# Patient Record
Sex: FEMALE | Race: WHITE | NOT HISPANIC OR LATINO | Employment: UNEMPLOYED | URBAN - METROPOLITAN AREA
[De-identification: names, ages, dates, MRNs, and addresses within clinical notes are randomized per-mention and may not be internally consistent; named-entity substitution may affect disease eponyms.]

---

## 2018-01-24 ENCOUNTER — TRANSCRIBE ORDERS (OUTPATIENT)
Dept: ADMINISTRATIVE | Facility: HOSPITAL | Age: 23
End: 2018-01-24

## 2018-01-24 DIAGNOSIS — N63.0 LUMP OR MASS IN BREAST: Primary | ICD-10-CM

## 2018-01-29 ENCOUNTER — HOSPITAL ENCOUNTER (OUTPATIENT)
Dept: ULTRASOUND IMAGING | Facility: CLINIC | Age: 23
Discharge: HOME | End: 2018-01-29
Payer: COMMERCIAL

## 2018-01-29 DIAGNOSIS — N63.0 LUMP OR MASS IN BREAST: ICD-10-CM

## 2018-01-29 PROCEDURE — 76642 ULTRASOUND BREAST LIMITED: CPT

## 2018-03-09 ENCOUNTER — HOSPITAL ENCOUNTER (OUTPATIENT)
Dept: ULTRASOUND IMAGING | Facility: CLINIC | Age: 23
Discharge: HOME | End: 2018-03-09
Payer: COMMERCIAL

## 2018-03-09 DIAGNOSIS — N63.0 LUMP OR MASS IN BREAST: ICD-10-CM

## 2018-03-09 PROCEDURE — 76642 ULTRASOUND BREAST LIMITED: CPT

## 2018-03-27 ENCOUNTER — TRANSCRIBE ORDERS (OUTPATIENT)
Dept: ADMINISTRATIVE | Facility: HOSPITAL | Age: 23
End: 2018-03-27

## 2018-03-27 DIAGNOSIS — N63.0 BREAST MASS: Primary | ICD-10-CM

## 2018-09-04 ENCOUNTER — HOSPITAL ENCOUNTER (OUTPATIENT)
Dept: ULTRASOUND IMAGING | Facility: CLINIC | Age: 23
Discharge: HOME | End: 2018-09-04
Payer: COMMERCIAL

## 2018-09-04 DIAGNOSIS — N63.0 BREAST MASS: ICD-10-CM

## 2018-09-04 PROCEDURE — 76642 ULTRASOUND BREAST LIMITED: CPT

## 2018-10-17 ENCOUNTER — HOSPITAL ENCOUNTER (EMERGENCY)
Facility: HOSPITAL | Age: 23
Discharge: HOME | End: 2018-10-17
Attending: EMERGENCY MEDICINE
Payer: COMMERCIAL

## 2018-10-17 VITALS
HEART RATE: 94 BPM | OXYGEN SATURATION: 99 % | RESPIRATION RATE: 18 BRPM | DIASTOLIC BLOOD PRESSURE: 74 MMHG | TEMPERATURE: 97.8 F | WEIGHT: 187.6 LBS | SYSTOLIC BLOOD PRESSURE: 124 MMHG

## 2018-10-17 DIAGNOSIS — Z34.90 PREGNANCY AT EARLY STAGE: ICD-10-CM

## 2018-10-17 DIAGNOSIS — R51.9 HEADACHE: ICD-10-CM

## 2018-10-17 DIAGNOSIS — E86.0 DEHYDRATION: ICD-10-CM

## 2018-10-17 DIAGNOSIS — O21.9 NAUSEA/VOMITING IN PREGNANCY: Primary | ICD-10-CM

## 2018-10-17 LAB
ANION GAP SERPL CALCULATED.3IONS-SCNC: 9 MMOL/L (ref 4–13)
BASOPHILS # BLD AUTO: 0.03 THOUSANDS/ΜL (ref 0–0.1)
BASOPHILS NFR BLD AUTO: 0 % (ref 0–1)
BILIRUB UR QL STRIP: ABNORMAL
BUN SERPL-MCNC: 10 MG/DL (ref 5–25)
CALCIUM SERPL-MCNC: 9.8 MG/DL (ref 8.3–10.1)
CHLORIDE SERPL-SCNC: 99 MMOL/L (ref 100–108)
CLARITY UR: CLEAR
CLARITY, POC: CLEAR
CO2 SERPL-SCNC: 25 MMOL/L (ref 21–32)
COLOR UR: YELLOW
COLOR, POC: YELLOW
CREAT SERPL-MCNC: 0.62 MG/DL (ref 0.6–1.3)
EOSINOPHIL # BLD AUTO: 0.04 THOUSAND/ΜL (ref 0–0.61)
EOSINOPHIL NFR BLD AUTO: 1 % (ref 0–6)
ERYTHROCYTE [DISTWIDTH] IN BLOOD BY AUTOMATED COUNT: 12.3 % (ref 11.6–15.1)
EXT PREG TEST URINE: POSITIVE
GFR SERPL CREATININE-BSD FRML MDRD: 130 ML/MIN/1.73SQ M
GLUCOSE SERPL-MCNC: 101 MG/DL (ref 65–140)
GLUCOSE UR STRIP-MCNC: NEGATIVE MG/DL
HCT VFR BLD AUTO: 39.4 % (ref 34.8–46.1)
HGB BLD-MCNC: 13.5 G/DL (ref 11.5–15.4)
HGB UR QL STRIP.AUTO: NEGATIVE
IMM GRANULOCYTES # BLD AUTO: 0.01 THOUSAND/UL (ref 0–0.2)
IMM GRANULOCYTES NFR BLD AUTO: 0 % (ref 0–2)
KETONES UR STRIP-MCNC: ABNORMAL MG/DL
LEUKOCYTE ESTERASE UR QL STRIP: NEGATIVE
LYMPHOCYTES # BLD AUTO: 1.05 THOUSANDS/ΜL (ref 0.6–4.47)
LYMPHOCYTES NFR BLD AUTO: 15 % (ref 14–44)
MCH RBC QN AUTO: 28.8 PG (ref 26.8–34.3)
MCHC RBC AUTO-ENTMCNC: 34.3 G/DL (ref 31.4–37.4)
MCV RBC AUTO: 84 FL (ref 82–98)
MONOCYTES # BLD AUTO: 0.43 THOUSAND/ΜL (ref 0.17–1.22)
MONOCYTES NFR BLD AUTO: 6 % (ref 4–12)
NEUTROPHILS # BLD AUTO: 5.34 THOUSANDS/ΜL (ref 1.85–7.62)
NEUTS SEG NFR BLD AUTO: 78 % (ref 43–75)
NITRITE UR QL STRIP: NEGATIVE
NRBC BLD AUTO-RTO: 0 /100 WBCS
PH UR STRIP.AUTO: 6 [PH] (ref 4.5–8)
PLATELET # BLD AUTO: 296 THOUSANDS/UL (ref 149–390)
PMV BLD AUTO: 10 FL (ref 8.9–12.7)
POTASSIUM SERPL-SCNC: 3.8 MMOL/L (ref 3.5–5.3)
PROT UR STRIP-MCNC: ABNORMAL MG/DL
RBC # BLD AUTO: 4.69 MILLION/UL (ref 3.81–5.12)
SODIUM SERPL-SCNC: 133 MMOL/L (ref 136–145)
SP GR UR STRIP.AUTO: >=1.03 (ref 1–1.03)
UROBILINOGEN UR QL STRIP.AUTO: 1 E.U./DL
WBC # BLD AUTO: 6.9 THOUSAND/UL (ref 4.31–10.16)

## 2018-10-17 PROCEDURE — 96374 THER/PROPH/DIAG INJ IV PUSH: CPT

## 2018-10-17 PROCEDURE — 80048 BASIC METABOLIC PNL TOTAL CA: CPT | Performed by: EMERGENCY MEDICINE

## 2018-10-17 PROCEDURE — 81025 URINE PREGNANCY TEST: CPT | Performed by: EMERGENCY MEDICINE

## 2018-10-17 PROCEDURE — 96361 HYDRATE IV INFUSION ADD-ON: CPT

## 2018-10-17 PROCEDURE — 85025 COMPLETE CBC W/AUTO DIFF WBC: CPT | Performed by: EMERGENCY MEDICINE

## 2018-10-17 PROCEDURE — 87086 URINE CULTURE/COLONY COUNT: CPT

## 2018-10-17 PROCEDURE — 99283 EMERGENCY DEPT VISIT LOW MDM: CPT

## 2018-10-17 PROCEDURE — 36415 COLL VENOUS BLD VENIPUNCTURE: CPT | Performed by: EMERGENCY MEDICINE

## 2018-10-17 PROCEDURE — 81001 URINALYSIS AUTO W/SCOPE: CPT

## 2018-10-17 RX ORDER — ONDANSETRON 2 MG/ML
4 INJECTION INTRAMUSCULAR; INTRAVENOUS ONCE
Status: COMPLETED | OUTPATIENT
Start: 2018-10-17 | End: 2018-10-17

## 2018-10-17 RX ORDER — ACETAMINOPHEN 325 MG/1
650 TABLET ORAL ONCE
Status: COMPLETED | OUTPATIENT
Start: 2018-10-17 | End: 2018-10-17

## 2018-10-17 RX ORDER — ONDANSETRON 4 MG/1
4 TABLET, ORALLY DISINTEGRATING ORAL EVERY 8 HOURS PRN
Qty: 20 TABLET | Refills: 0 | Status: SHIPPED | OUTPATIENT
Start: 2018-10-17 | End: 2018-10-31

## 2018-10-17 RX ADMIN — ACETAMINOPHEN 650 MG: 325 TABLET, FILM COATED ORAL at 22:41

## 2018-10-17 RX ADMIN — ONDANSETRON 4 MG: 2 INJECTION INTRAMUSCULAR; INTRAVENOUS at 22:34

## 2018-10-17 RX ADMIN — SODIUM CHLORIDE 1000 ML: 0.9 INJECTION, SOLUTION INTRAVENOUS at 22:34

## 2018-10-18 LAB
BACTERIA UR QL AUTO: ABNORMAL /HPF
MUCOUS THREADS UR QL AUTO: ABNORMAL
NON-SQ EPI CELLS URNS QL MICRO: ABNORMAL /HPF
RBC #/AREA URNS AUTO: ABNORMAL /HPF
WBC #/AREA URNS AUTO: ABNORMAL /HPF

## 2018-10-18 NOTE — DISCHARGE INSTRUCTIONS
Nausea and Vomiting in Pregnancy   WHAT YOU NEED TO KNOW:   Nausea and vomiting can happen any time of day  These symptoms usually start before the 9th week of pregnancy, and end by the 14th week (second trimester)  Some women can have nausea and vomiting for a longer time  These symptoms can affect some women throughout the entire pregnancy  Nausea and vomiting do not harm your baby  These symptoms can make it hard for you to do your daily activities  DISCHARGE INSTRUCTIONS:   Return to the emergency department if:   · You have signs of dehydration  Examples are dark yellow urine, dry mouth and lips, dry skin, fast heartbeat, and urinating less than usual     · You have severe abdominal pain  · You feel too weak or dizzy to stand up  · You see blood in your vomit or bowel movements  Contact your healthcare provider if:   · You vomit more than 4 times in 1 day  · You have not been able to keep liquids down for more than 1 day  · You lose more than 2 pounds  · You have a fever  · Your nausea and vomiting continue longer than 14 weeks  · You have questions or concerns about your condition or care  Nutrition changes you can make to manage nausea and vomiting:   · Eat small meals throughout the day instead of 3 large meals  You may be more likely to have nausea and vomiting when your stomach is empty  Eat foods that are low in fat and high in protein  Examples are lean meat, beans, turkey, and chicken without the skin  Eat a small snack, such as crackers, dry cereal, or a small sandwich before you go to bed  · Eat some crackers or dry toast before you get out of bed in the morning  Get out of bed slowly  Sudden movements could cause you to get dizzy and nauseated  · Eat bland foods when you feel nauseated  Examples of bland foods include dry toast, dry cereal, plain pasta, white rice, and bread  Other bland foods include saltine crackers, bananas, gelatin, and pretzels   Avoid spicy, greasy, and fried foods  Avoid any other foods that make you feel nauseated  · Drink liquids that contain ginger  Drink ginger ale made with real ginger or ginger tea made with fresh grated ginger  Ginger capsules or ginger candies may also help to decrease nausea and vomiting  · Drink liquids between meals instead of with meals  Wait at least 30 minutes after you eat to drink liquids  Drink small amounts of liquids often throughout the day to prevent dehydration  Ask how much liquid you should drink each day  Other changes you can make to manage nausea and vomiting:   · Avoid smells that bother you  Strong odors may cause nausea and vomiting to start, or make it worse  Take a short walk, turn on a fan, or try to sleep with the window open to get fresh air  When you are cooking, open windows to get rid of smells that may cause nausea  · Do not brush your teeth right after you eat  if it makes you nauseated  · Rest when you need to  Start activity slowly and work up to your usual routine as you start to feel better  · Talk to your healthcare provider about your prenatal vitamins  Prenatal vitamins can cause nausea for some women  Try taking your prenatal vitamin at night or with a snack  If this change does not help, your healthcare provider may recommend a different type of vitamin  · Do not use any medicines, vitamins, or supplements to manage your symptoms without asking your healthcare provider  Many medicines can harm an unborn baby  · Light to moderate exercise  may help to decrease your symptoms  It may also help you to sleep better at night  Ask your healthcare provider about the best exercise plan for you  Follow up with your healthcare provider as directed:  Write down your questions so you remember to ask them during your visits     © 2017 [employer] Information is for End User's use only and may not be sold, redistributed or otherwise used for commercial purposes  All illustrations and images included in CareNotes® are the copyrighted property of [employer] M [employer]  or [employer]  The above information is an  only  It is not intended as medical advice for individual conditions or treatments  Talk to your doctor, nurse or pharmacist before following any medical regimen to see if it is safe and effective for you  Acute Headache   WHAT YOU NEED TO KNOW:   What is an acute headache? An acute headache is pain or discomfort that starts suddenly and gets worse quickly  You may have an acute headache only when you feel stress or eat certain foods  Other acute headache pain can happen every day, and sometimes several times a day  What are the most common types of acute headache? · Tension headache  is the most common type of headache  These headaches typically occur in the late afternoon and go away by evening  The pain is usually mild or moderate  You may have problems tolerating bright light or loud noise  The pain is usually across the forehead or in the back of the head, often only on one side  These headaches may occur every day  · Migraine headaches  cause moderate or severe pain  The headache generally lasts from 1 to 3 days and tends to come back  Pain is usually on only one side, but it may change sides  Migraines often occur in the temple, the back of the head, or behind the eye  The pain may throb or be sharp and steady  · A migraine with aura  means you see or feel something before a migraine  You may see a small spot surrounded by bright zigzag lines  Other signs or symptoms may follow the aura  · Cluster headache  pain is usually only on one side  It often causes severe pain, and can last for 30 minutes to 2 hours  These headaches may occur 1 or 2 times each day, more often at night  The pain may wake you  What causes acute headaches? The cause of your headache may not be known   The following conditions can trigger a headache:  · Stress or tension, hours or even days after stressful events    · Fatigue, a lack of sleep or changes in your usual sleep pattern, or a nap during the day    · Menstruation, especially after pregnancy, or use of birth control pills or hormone replacement therapy    · Food such as cured meats, artificial sweeteners, alcohol, dark chocolate, and MSG     · Suddenly not having caffeine if you usually have larger amounts    · A medical problem, such as an infection, tooth pain, neck or sinus pain, thyroid problems, or a tumor    · A head injury  How is the type of acute headache diagnosed and treated? Your healthcare provider will ask you to describe your pain and rate it on a scale from 1 to 10  Tell the provider how often you have headaches and how long they last  Also describe any other symptoms you have along with headaches, such as dizziness or blurred vision  · Medicines  may be given to manage or prevent headaches  The medicine will depend on the type of acute headache you have  Do not wait until the pain is severe before you take your medicine  You may be able to take over-the-counter pain medicines as needed  Examples include NSAIDs and acetaminophen  Ask your healthcare provider which medicine is right for you  Ask how much to take and when to take it  Follow directions  These medicines can cause stomach bleeding or kidney or liver damage if not taken correctly  · Biofeedback  may be used to help you manage stress  Electrodes (wires) are placed on your body and attached to a monitor  You will learn how to change stress reactions  For example, you learn to slow your heart rate when you become upset  · Cognitive behavior therapy,  or stress management, may be used with other therapies to prevent headaches  What can I do to manage my symptoms? · Apply heat or ice  on the headache area  Use a heat or ice pack  For an ice pack, you can also put crushed ice in a plastic bag   Cover the pack or bag with a towel before you apply it to your skin  Ice and heat both help decrease pain, and heat also helps decrease muscle spasms  Apply heat for 20 to 30 minutes every 2 hours  Apply ice for 15 to 20 minutes every hour  Apply heat or ice for as long and for as many days as directed  You may alternate heat and ice  · Relax your muscles  Lie down in a comfortable position and close your eyes  Relax your muscles slowly  Start at your toes and work your way up your body  · Keep a record of your headaches  Write down when your headaches start and stop  Include your symptoms and what you were doing when the headache began  Record what you ate or drank for 24 hours before the headache started  Describe the pain and where it hurts  Keep track of what you did to treat your headache and if it worked  What can I do to prevent an acute headache? · Avoid anything that triggers an acute headache  Examples include exposure to chemicals, going to high altitude, or not getting enough sleep  Create a regular sleep routine  Go to sleep at the same time and wake up at the same time each day  Do not use electronic devices before bedtime  These may trigger a headache or prevent you from sleeping well  · Do not smoke  Nicotine and other chemicals in cigarettes and cigars can trigger an acute headache or make it worse  Ask your healthcare provider for information if you currently smoke and need help to quit  E-cigarettes or smokeless tobacco still contain nicotine  Talk to your healthcare provider before you use these products  · Limit alcohol as directed  Alcohol can trigger an acute headache or make it worse  If you have cluster headaches, do not drink alcohol during an episode  For other types of headaches, ask your healthcare provider if it is safe for you to drink alcohol  Ask how much is safe for you to drink, and how often  · Exercise as directed  Exercise can reduce tension and help with headache pain  Aim for 30 minutes of physical activity on most days of the week  Your healthcare provider can help you create an exercise plan  · Eat a variety of healthy foods  Healthy foods include fruits, vegetables, low-fat dairy products, lean meats, fish, whole grains, and cooked beans  Your healthcare provider or dietitian can help you create meals plans if you need to avoid foods that trigger headaches  When should I seek immediate care? · You have severe pain  · You have numbness or weakness on one side of your face or body  · You have a headache that occurs after a blow to the head, a fall, or other trauma  · You have a headache, are forgetful or confused, or have trouble speaking  · You have a headache, stiff neck, and a fever  When should I contact my healthcare provider? · You have a constant headache and are vomiting  · You have a headache each day that does not get better, even after treatment  · You have changes in your headaches, or new symptoms that occur when you have a headache  · You have questions or concerns about your condition or care  CARE AGREEMENT:   You have the right to help plan your care  Learn about your health condition and how it may be treated  Discuss treatment options with your caregivers to decide what care you want to receive  You always have the right to refuse treatment  The above information is an  only  It is not intended as medical advice for individual conditions or treatments  Talk to your doctor, nurse or pharmacist before following any medical regimen to see if it is safe and effective for you  © 2017 [employer] Information is for End User's use only and may not be sold, redistributed or otherwise used for commercial purposes  All illustrations and images included in CareNotes® are the copyrighted property of A [employer] M [employer] Inc  or [employer]   WHAT YOU NEED TO KNOW:   Dehydration is a condition that develops when your body does not have enough fluid  You may become dehydrated if you do not drink enough water or lose too much fluid  Fluid loss may also cause loss of electrolytes (minerals), such as sodium  DISCHARGE INSTRUCTIONS:   Return to the emergency department if:   · You have a seizure  · You are confused or cannot think clearly  · You are extremely sleepy, or another person cannot wake you  · You become dizzy or faint when you stand  · You are not able to urinate  · You have trouble breathing  · You have a fast or irregular heartbeat  · Your hands or feet are cold, or your face is pale  Contact your healthcare provider if:   · You have trouble drinking liquids because you are vomiting  · Your symptoms get worse  · You have a fever  · You feel very weak or tired  · You have questions or concerns about your condition or care  Follow up with your healthcare provider as directed:  Write down your questions so you remember to ask them during your visits  Prevent or manage dehydration:   · Drink liquids as directed  Liquids that contain water, sugar, and minerals can help your body hold in fluid and help prevent dehydration  Drink liquids throughout the day, not just when you feel thirsty  Men should drink about 3 liters (13 eight-ounce cups) of liquid each day  Women should drink about 2 liters (9 eight-ounce cups) of liquid each day  Drink even more liquid if you will be outdoors, in the sun for a long time, or exercising  · Stay cool  Limit the time you spend outdoors during the hottest part of the day  Dress in lightweight clothes  · Keep track of how often you urinate  If you urinate less than usual or your urine is darker, drink more liquids  © 2017 [employer] Information is for End User's use only and may not be sold, redistributed or otherwise used for commercial purposes   All illustrations and images included in CareNotes® are the copyrighted property of [employer]  or [employer]  The above information is an  only  It is not intended as medical advice for individual conditions or treatments  Talk to your doctor, nurse or pharmacist before following any medical regimen to see if it is safe and effective for you

## 2018-10-18 NOTE — ED PROVIDER NOTES
History  Chief Complaint   Patient presents with    Nausea     pt c/o nausea; pt states she had a positive pregnancy test on 10/3/18 and states she may be 4 weeks pregnant; pt states the nausea is constant and has vomited x4 times today along with headaches  22 yo female who is ~ 4 weeks pregnant and has first appt with [facility] at [address] Oct 29 who has been nauseas for past 2 weeks and today vomited 5 times since 1730  History provided by:  Patient   used: No    Nausea   The primary symptoms include abdominal pain, nausea and vomiting  Primary symptoms do not include fever, fatigue, diarrhea, dysuria or rash  The illness began more than 7 days ago  The onset was gradual    Nausea began more than 1 week ago  The nausea is exacerbated by food  The illness does not include chills or back pain  None       History reviewed  No pertinent past medical history  Past Surgical History:   Procedure Laterality Date    NO PAST SURGERIES         History reviewed  No pertinent family history  I have reviewed and agree with the history as documented  Social History   Substance Use Topics    Smoking status: Never Smoker    Smokeless tobacco: Never Used    Alcohol use No        Review of Systems   Constitutional: Negative for appetite change, chills, fatigue and fever  HENT: Negative for congestion and sore throat  Eyes: Negative for visual disturbance  Respiratory: Negative for shortness of breath  Cardiovascular: Negative for chest pain  Gastrointestinal: Positive for abdominal distention, abdominal pain, nausea and vomiting  Negative for diarrhea  Genitourinary: Negative for dysuria, frequency, vaginal bleeding and vaginal discharge  Musculoskeletal: Negative for back pain, neck pain and neck stiffness  Skin: Negative for pallor and rash  Allergic/Immunologic: Negative for immunocompromised state  Neurological: Positive for headaches (frontal)   Negative for light-headedness  Psychiatric/Behavioral: Negative for confusion  All other systems reviewed and are negative  Physical Exam  Physical Exam   Constitutional: She is oriented to person, place, and time  She appears well-developed and well-nourished  No distress  HENT:   Head: Normocephalic and atraumatic  Right Ear: External ear normal    Left Ear: External ear normal    MM tachy   Eyes: Pupils are equal, round, and reactive to light  EOM are normal    Neck: Normal range of motion  Neck supple  Cardiovascular: Regular rhythm  No murmur heard     Pulmonary/Chest: Effort normal and breath sounds normal  No respiratory distress  Abdominal: Soft  Bowel sounds are normal    Musculoskeletal: Normal range of motion  Neurological: She is alert and oriented to person, place, and time  She displays normal reflexes  No cranial nerve deficit  Coordination normal    Skin: Skin is warm  Capillary refill takes less than 2 seconds  No rash noted  No pallor  Psychiatric: She has a normal mood and affect  Her behavior is normal    Nursing note and vitals reviewed        Vital Signs  ED Triage Vitals [10/17/18 2132]   Temperature Pulse Respirations Blood Pressure SpO2   97 8 °F (36 6 °C) 98 18 129/88 99 %      Temp Source Heart Rate Source Patient Position - Orthostatic VS BP Location FiO2 (%)   Oral Monitor Sitting Right arm --      Pain Score       No Pain           Vitals:    10/17/18 2132 10/17/18 2241 10/17/18 2347   BP: 129/88 131/84 124/74   Pulse: 98 94 94   Patient Position - Orthostatic VS: Sitting Lying Lying       Visual Acuity      ED Medications  Medications   sodium chloride 0 9 % bolus 1,000 mL (0 mL Intravenous Stopped 10/17/18 2328)   ondansetron (ZOFRAN) injection 4 mg (4 mg Intravenous Given 10/17/18 2234)   acetaminophen (TYLENOL) tablet 650 mg (650 mg Oral Given 10/17/18 2241)       Diagnostic Studies  Results Reviewed     Procedure Component Value Units Date/Time    Urine Microscopic [[id number]]  (Abnormal) Collected:  10/17/18 2345    Lab Status:  Final result Specimen:  Urine from Urine, Clean Catch Updated:  10/18/18 0018     RBC, UA None Seen /hpf      WBC, UA 4-10 (A) /hpf      Epithelial Cells Moderate (A) /hpf      Bacteria, UA Moderate (A) /hpf      MUCOUS THREADS Moderate (A)    Urine culture [[id number]] Collected:  10/17/18 2345    Lab Status:   In process Specimen:  Urine from Urine, Clean Catch Updated:  10/17/18 2336    POCT pregnancy, urine [[id number]]  (Abnormal) Resulted:  10/17/18 2334    Lab Status:  Final result Updated:  10/17/18 2335     EXT PREG TEST UR (Ref: Negative) POSITIVE    POCT urinalysis dipstick [[id number]]  (Normal) Resulted:  10/17/18 2334    Lab Status:  Final result Specimen:  Urine Updated:  10/17/18 2334     Color, UA YELLOW     Clarity, UA CLEAR    ED Urine Macroscopic [[id number]]  (Abnormal) Collected:  10/17/18 2345    Lab Status:  Final result Specimen:  Urine Updated:  10/17/18 2333     Color, UA Yellow     Clarity, UA Clear     pH, UA 6 0     Leukocytes, UA Negative     Nitrite, UA Negative     Protein, UA Trace (A) mg/dl      Glucose, UA Negative mg/dl      Ketones, UA 80 (3+) (A) mg/dl      Urobilinogen, UA 1 0 E U /dl      Bilirubin, UA Interference- unable to analyze (A)     Blood, UA Negative     Specific Gravity, UA >=1 030    Narrative:       CLINITEK RESULT    Basic metabolic panel [[id number]]  (Abnormal) Collected:  10/17/18 2234    Lab Status:  Final result Specimen:  Blood from Arm, Right Updated:  10/17/18 2249     Sodium 133 (L) mmol/L      Potassium 3 8 mmol/L      Chloride 99 (L) mmol/L      CO2 25 mmol/L      ANION GAP 9 mmol/L      BUN 10 mg/dL      Creatinine 0 62 mg/dL      Glucose 101 mg/dL      Calcium 9 8 mg/dL      eGFR 130 ml/min/1 73sq m     Narrative:         National Kidney Disease Education Program recommendations are as follows:  GFR calculation is accurate only with a steady state creatinine  Chronic Kidney disease less than 60 ml/min/1 73 sq  meters  Kidney failure less than 15 ml/min/1 73 sq  meters  CBC and differential [[id number]]  (Abnormal) Collected:  10/17/18 2234    Lab Status:  Final result Specimen:  Blood from Arm, Right Updated:  10/17/18 2239     WBC 6 90 Thousand/uL      RBC 4 69 Million/uL      Hemoglobin 13 5 g/dL      Hematocrit 39 4 %      MCV 84 fL      MCH 28 8 pg      MCHC 34 3 g/dL      RDW 12 3 %      MPV 10 0 fL      Platelets 918 Thousands/uL      nRBC 0 /100 WBCs      Neutrophils Relative 78 (H) %      Immat GRANS % 0 %      Lymphocytes Relative 15 %      Monocytes Relative 6 %      Eosinophils Relative 1 %      Basophils Relative 0 %      Neutrophils Absolute 5 34 Thousands/µL      Immature Grans Absolute 0 01 Thousand/uL      Lymphocytes Absolute 1 05 Thousands/µL      Monocytes Absolute 0 43 Thousand/µL      Eosinophils Absolute 0 04 Thousand/µL      Basophils Absolute 0 03 Thousands/µL                  No orders to display              Procedures  Procedures       Phone Contacts  ED Phone Contact    ED Course  ED Course as of Oct 18 0102   Wed Oct 17, 2018   2208 Pt seen and examined  24 yo female who is ~ 4 weeks pregnant and has first appt with [facility] at [address] Oct 29 who has been nauseas for past 2 weeks and today vomited 5 times since 1730  Pt well appearing, mildly dehydrate - MM tachy,  on exam   Abd benign - soft, NT/ND  Will give IVF, zofran  When less nauseas will give tylenol for headache      2329 Nausea resolved, IVF finished - pt attempting to give urine sample  Labs reviewed and ok  2340 Urine shows SG > 1030, pt feels better and wants to go  Encouraged po fluids with prn zofran and f/u with [facility] as scheduled  Pt happy [facility] and understands ability to return if symptoms worsen or any concerns                                   [facility]  CritCare Time    Disposition  Final diagnoses:   Nausea/vomiting in pregnancy   Dehydration   Pregnancy at early stage   Headache     Time reflects when diagnosis was documented in both MDM as applicable and the Disposition within this note     Time User Action Codes Description Comment    10/17/2018 11:38 PM Mary Bassam Add [O21 9] Nausea/vomiting in pregnancy     10/17/2018 11:38 PM Mary Hayso Add [E86 0] Dehydration     10/17/2018 11:38 PM Mary Bassam Add [Z34 90] Pregnancy at early stage     10/17/2018 11:38 PM Dariusz, [address] Headache       ED Disposition     ED Disposition Condition Comment    Discharge  [facility] discharge to home/self care  Condition at discharge: Good        Follow-up Information     Follow up With Specialties Details Why [id number] [facility]ne[facility]  as scheduled oct 29 [address]  [phone]            Discharge Medication List as of 10/17/2018 11:39 PM      START taking these medications    Details   ondansetron (ZOFRAN-ODT) 4 mg disintegrating tablet Take 1 tablet (4 mg total) by mouth every 8 (eight) hours as needed for nausea or vomiting for up to 7 days, Starting Wed 10/17/2018, Until Wed 10/24/2018, Print           No discharge procedures on file      ED Provider  Electronically Signed by           Bossman Carter DO  10/18/18 0102

## 2018-10-19 LAB
BACTERIA UR CULT: ABNORMAL
BACTERIA UR CULT: ABNORMAL

## 2018-10-31 ENCOUNTER — HOSPITAL ENCOUNTER (EMERGENCY)
Facility: HOSPITAL | Age: 23
Discharge: HOME | End: 2018-10-31
Attending: EMERGENCY MEDICINE | Admitting: EMERGENCY MEDICINE
Payer: COMMERCIAL

## 2018-10-31 VITALS
OXYGEN SATURATION: 100 % | TEMPERATURE: 98 F | WEIGHT: 187 LBS | DIASTOLIC BLOOD PRESSURE: 75 MMHG | HEART RATE: 86 BPM | SYSTOLIC BLOOD PRESSURE: 123 MMHG | RESPIRATION RATE: 18 BRPM

## 2018-10-31 DIAGNOSIS — O21.9 NAUSEA AND VOMITING IN PREGNANCY: Primary | ICD-10-CM

## 2018-10-31 DIAGNOSIS — R10.13 EPIGASTRIC ABDOMINAL PAIN: ICD-10-CM

## 2018-10-31 LAB
ALBUMIN SERPL BCP-MCNC: 4.1 G/DL (ref 3.5–5)
ALP SERPL-CCNC: 59 U/L (ref 46–116)
ALT SERPL W P-5'-P-CCNC: 19 U/L (ref 12–78)
ANION GAP SERPL CALCULATED.3IONS-SCNC: 12 MMOL/L (ref 4–13)
AST SERPL W P-5'-P-CCNC: 11 U/L (ref 5–45)
BACTERIA UR QL AUTO: ABNORMAL /HPF
BASOPHILS # BLD AUTO: 0.03 THOUSANDS/ΜL (ref 0–0.1)
BASOPHILS NFR BLD AUTO: 1 % (ref 0–1)
BILIRUB SERPL-MCNC: 0.33 MG/DL (ref 0.2–1)
BILIRUB UR QL STRIP: ABNORMAL
BUN SERPL-MCNC: 8 MG/DL (ref 5–25)
CALCIUM SERPL-MCNC: 9.9 MG/DL (ref 8.3–10.1)
CHLORIDE SERPL-SCNC: 98 MMOL/L (ref 100–108)
CLARITY UR: CLEAR
CO2 SERPL-SCNC: 25 MMOL/L (ref 21–32)
COLOR UR: YELLOW
CREAT SERPL-MCNC: 0.62 MG/DL (ref 0.6–1.3)
EOSINOPHIL # BLD AUTO: 0.06 THOUSAND/ΜL (ref 0–0.61)
EOSINOPHIL NFR BLD AUTO: 1 % (ref 0–6)
ERYTHROCYTE [DISTWIDTH] IN BLOOD BY AUTOMATED COUNT: 12.5 % (ref 11.6–15.1)
GFR SERPL CREATININE-BSD FRML MDRD: 130 ML/MIN/1.73SQ M
GLUCOSE SERPL-MCNC: 95 MG/DL (ref 65–140)
GLUCOSE UR STRIP-MCNC: NEGATIVE MG/DL
HCT VFR BLD AUTO: 39 % (ref 34.8–46.1)
HGB BLD-MCNC: 13.5 G/DL (ref 11.5–15.4)
HGB UR QL STRIP.AUTO: NEGATIVE
IMM GRANULOCYTES # BLD AUTO: 0.02 THOUSAND/UL (ref 0–0.2)
IMM GRANULOCYTES NFR BLD AUTO: 0 % (ref 0–2)
KETONES UR STRIP-MCNC: ABNORMAL MG/DL
LEUKOCYTE ESTERASE UR QL STRIP: NEGATIVE
LIPASE SERPL-CCNC: 149 U/L (ref 73–393)
LYMPHOCYTES # BLD AUTO: 1.45 THOUSANDS/ΜL (ref 0.6–4.47)
LYMPHOCYTES NFR BLD AUTO: 24 % (ref 14–44)
MCH RBC QN AUTO: 29 PG (ref 26.8–34.3)
MCHC RBC AUTO-ENTMCNC: 34.6 G/DL (ref 31.4–37.4)
MCV RBC AUTO: 84 FL (ref 82–98)
MONOCYTES # BLD AUTO: 0.52 THOUSAND/ΜL (ref 0.17–1.22)
MONOCYTES NFR BLD AUTO: 9 % (ref 4–12)
NEUTROPHILS # BLD AUTO: 4.07 THOUSANDS/ΜL (ref 1.85–7.62)
NEUTS SEG NFR BLD AUTO: 65 % (ref 43–75)
NITRITE UR QL STRIP: NEGATIVE
NON-SQ EPI CELLS URNS QL MICRO: ABNORMAL /HPF
NRBC BLD AUTO-RTO: 0 /100 WBCS
PH UR STRIP.AUTO: 5.5 [PH] (ref 4.5–8)
PLATELET # BLD AUTO: 290 THOUSANDS/UL (ref 149–390)
PMV BLD AUTO: 9.9 FL (ref 8.9–12.7)
POTASSIUM SERPL-SCNC: 3.4 MMOL/L (ref 3.5–5.3)
PROT SERPL-MCNC: 8 G/DL (ref 6.4–8.2)
PROT UR STRIP-MCNC: ABNORMAL MG/DL
RBC # BLD AUTO: 4.65 MILLION/UL (ref 3.81–5.12)
RBC #/AREA URNS AUTO: ABNORMAL /HPF
SODIUM SERPL-SCNC: 135 MMOL/L (ref 136–145)
SP GR UR STRIP.AUTO: >=1.03 (ref 1–1.03)
UROBILINOGEN UR QL STRIP.AUTO: 0.2 E.U./DL
WBC # BLD AUTO: 6.15 THOUSAND/UL (ref 4.31–10.16)
WBC #/AREA URNS AUTO: ABNORMAL /HPF

## 2018-10-31 PROCEDURE — 96374 THER/PROPH/DIAG INJ IV PUSH: CPT

## 2018-10-31 PROCEDURE — 81001 URINALYSIS AUTO W/SCOPE: CPT

## 2018-10-31 PROCEDURE — 83690 ASSAY OF LIPASE: CPT | Performed by: EMERGENCY MEDICINE

## 2018-10-31 PROCEDURE — 85025 COMPLETE CBC W/AUTO DIFF WBC: CPT | Performed by: EMERGENCY MEDICINE

## 2018-10-31 PROCEDURE — 99284 EMERGENCY DEPT VISIT MOD MDM: CPT

## 2018-10-31 PROCEDURE — 87086 URINE CULTURE/COLONY COUNT: CPT

## 2018-10-31 PROCEDURE — 80053 COMPREHEN METABOLIC PANEL: CPT | Performed by: EMERGENCY MEDICINE

## 2018-10-31 PROCEDURE — 96361 HYDRATE IV INFUSION ADD-ON: CPT

## 2018-10-31 PROCEDURE — 36415 COLL VENOUS BLD VENIPUNCTURE: CPT | Performed by: EMERGENCY MEDICINE

## 2018-10-31 RX ORDER — METOCLOPRAMIDE HYDROCHLORIDE 5 MG/ML
10 INJECTION INTRAMUSCULAR; INTRAVENOUS ONCE
Status: COMPLETED | OUTPATIENT
Start: 2018-10-31 | End: 2018-10-31

## 2018-10-31 RX ORDER — PROMETHAZINE HYDROCHLORIDE 25 MG/1
25 SUPPOSITORY RECTAL EVERY 6 HOURS PRN
Qty: 5 SUPPOSITORY | Refills: 0 | Status: SHIPPED | OUTPATIENT
Start: 2018-10-31

## 2018-10-31 RX ORDER — METOCLOPRAMIDE 10 MG/1
10 TABLET ORAL EVERY 8 HOURS PRN
Qty: 12 TABLET | Refills: 0 | Status: SHIPPED | OUTPATIENT
Start: 2018-10-31

## 2018-10-31 RX ADMIN — METOCLOPRAMIDE 10 MG: 5 INJECTION, SOLUTION INTRAMUSCULAR; INTRAVENOUS at 18:11

## 2018-10-31 RX ADMIN — SODIUM CHLORIDE 1000 ML: 0.9 INJECTION, SOLUTION INTRAVENOUS at 20:23

## 2018-10-31 RX ADMIN — SODIUM CHLORIDE 1000 ML: 0.9 INJECTION, SOLUTION INTRAVENOUS at 18:11

## 2018-10-31 NOTE — DISCHARGE INSTRUCTIONS
You can continue to take the B6 and Unisom medications  Then use Reglan as needed for additional nausea and vomiting  If you are unable to tolerate Reglan tablets, use phenergan suppositories  Nausea and Vomiting in Pregnancy   AMBULATORY CARE:   Nausea and vomiting in pregnancy  can happen any time of day  These symptoms usually start before the 9th week of pregnancy, and end by the 14th week (second trimester)  Some women can have nausea and vomiting for a longer time  These symptoms can affect some women throughout the entire pregnancy  Nausea and vomiting do not harm your baby  These symptoms can make it hard for you to do your daily activities  Seek care immediately if:   · You have signs of dehydration  Examples are dark yellow urine, dry mouth and lips, dry skin, fast heartbeat, and urinating less than usual     · You have severe abdominal pain  · You feel too weak or dizzy to stand up  · You see blood in your vomit or bowel movements  Contact your healthcare provider if:   · You vomit more than 4 times in 1 day  · You have not been able to keep liquids down for more than 1 day  · You lose more than 2 pounds  · You have a fever  · Your nausea and vomiting continue longer than 14 weeks  · You have questions or concerns about your condition or care  Treatment  for nausea and vomiting in pregnancy is usually not needed  You can make changes in the foods you eat and in your activities to help manage your symptoms  You may need to try several things to learn what works for you  Talk to your healthcare provider if your symptoms do not decrease with the changes suggested below  You may need vitamin B6 and medicine if these changes do not help, or your symptoms become severe  Nutrition changes you can make to manage nausea and vomiting:   · Eat small meals throughout the day instead of 3 large meals  You may be more likely to have nausea and vomiting when your stomach is empty   Eat foods that are low in fat and high in protein  Examples are lean meat, beans, turkey, and chicken without the skin  Eat a small snack, such as crackers, dry cereal, or a small sandwich before you go to bed  · Eat some crackers or dry toast before you get out of bed in the morning  Get out of bed slowly  Sudden movements could cause you to get dizzy and nauseated  · Eat bland foods when you feel nauseated  Examples of bland foods include dry toast, dry cereal, plain pasta, white rice, and bread  Other bland foods include saltine crackers, bananas, gelatin, and pretzels  Avoid spicy, greasy, and fried foods  Avoid any other foods that make you feel nauseated  · Drink liquids that contain ginger  Drink ginger ale made with real ginger or ginger tea made with fresh grated ginger  Ginger capsules or ginger candies may also help to decrease nausea and vomiting  · Drink liquids between meals instead of with meals  Wait at least 30 minutes after you eat to drink liquids  Drink small amounts of liquids often throughout the day to prevent dehydration  Ask how much liquid you should drink each day  Other changes you can make to manage nausea and vomiting:   · Avoid smells that bother you  Strong odors may cause nausea and vomiting to start, or make it worse  Take a short walk, turn on a fan, or try to sleep with the window open to get fresh air  When you are cooking, open windows to get rid of smells that may cause nausea  · Do not brush your teeth right after you eat  if it makes you nauseated  · Rest when you need to  Start activity slowly and work up to your usual routine as you start to feel better  · Talk to your healthcare provider about your prenatal vitamins  Prenatal vitamins can cause nausea for some women  Try taking your prenatal vitamin at night or with a snack  If this change does not help, your healthcare provider may recommend a different type of vitamin       · Do not use any medicines, vitamins, or supplements to manage your symptoms without asking your healthcare provider  Many medicines can harm an unborn baby  · Light to moderate exercise  may help to decrease your symptoms  It may also help you to sleep better at night  Ask your healthcare provider about the best exercise plan for you  Follow up with your healthcare provider as directed:  Write down your questions so you remember to ask them during your visits  © 2017 [employer] Information is for End User's use only and may not be sold, redistributed or otherwise used for commercial purposes  All illustrations and images included in CareNotes® are the copyrighted property of [employer]  or [employer]  The above information is an  only  It is not intended as medical advice for individual conditions or treatments  Talk to your doctor, nurse or pharmacist before following any medical regimen to see if it is safe and effective for you

## 2018-10-31 NOTE — ED PROVIDER NOTES
History  Chief Complaint   Patient presents with    Vomiting During Pregnancy     nausea and vomiting since yesterday, was taken off zofran by obgyn because she was told it was not good for baby  Taking B6 and Unisom for the vomiting without relief  Abd pain from vomiting per patient, denies vaginal bleeding  A 42-year-old female who is  at approximately 8 weeks gestation; presents with vomiting and upper abdominal pain  Patient states she has had nausea and vomiting throughout the course of her pregnancy  Patient was seen approximately 2 weeks ago for the nausea and vomiting in the ED, and was prescribed Zofran however her OB instructed her to stop taking it  Patient states since stopping the Zofran, the vomiting has returned  Patient has been taking B6 and Unisom without relief of symptoms  Patient has also developed upper abdominal pain, described as a soreness  Patient has not had fever, chills, chest pain, shortness of breath, diarrhea, dysuria, urinary frequency/urgency, vaginal bleeding and discharge  Patient's OB is at [facility], and did undergo a confirmatory ultrasound on 10/29 which revealed an IUP  A/P:  Vomiting in the first trimester of pregnancy  Reproducible upper abdominal tenderness  No peritoneal signs  Bedside ultrasound reveals an IUP with a an appropriate fetal heart rate  Patient does appear clinically dehydrated  Will check labs for anemia, electrolyte abnormality and renal impairment  Will check urine for infection  Will give Reglan and IV fluids for symptomatic relief  History provided by:  Patient    None       History reviewed  No pertinent past medical history  Past Surgical History:   Procedure Laterality Date    NO PAST SURGERIES      WISDOM TOOTH EXTRACTION         History reviewed  No pertinent family history  I have reviewed and agree with the history as documented      Social History   Substance Use Topics    Smoking status: Never Smoker    Smokeless tobacco: Never Used    Alcohol use No        Review of Systems   Gastrointestinal: Positive for abdominal pain, nausea and vomiting  All other systems reviewed and are negative  Physical Exam  Physical Exam   General Appearance: alert and oriented, nad, non toxic appearing  Skin:  Warm, dry, intact  HEENT: atraumatic, normocephalic    Dry mucous membranes  Neck: Supple, trachea midline  Cardiac: RRR; no murmurs, rub, gallops  Pulmonary: lungs CTAB; no wheezes, rales, rhonchi  Gastrointestinal: abdomen soft, upper abdominal tenderness, nondistended; no guarding or rebound tenderness; good bowel sounds, no mass or bruits  Extremities:  no pedal edema, 2+ pulses; no calf tenderness, no clubbing, no cyanosis  Neuro:  no focal motor or sensory deficits, CN 2-12 grossly intact  Psych:  Normal mood and affect, normal judgement and insight      Vital Signs  ED Triage Vitals   Temperature Pulse Respirations Blood Pressure SpO2   10/31/18 1750 10/31/18 1750 10/31/18 1750 10/31/18 1750 10/31/18 1750   98 °F (36 7 °C) 101 18 158/87 100 %      Temp Source Heart Rate Source Patient Position - Orthostatic VS BP Location FiO2 (%)   10/31/18 1750 10/31/18 1926 10/31/18 1926 10/31/18 1926 --   Oral Monitor Sitting Right arm       Pain Score       10/31/18 1750       6           Vitals:    10/31/18 1750 10/31/18 1926 10/31/18 2110   BP: 158/87 139/86 123/75   Pulse: 101 91 86   Patient Position - Orthostatic VS:  Sitting Sitting       Visual Acuity      ED Medications  Medications   sodium chloride 0 9 % bolus 1,000 mL (0 mL Intravenous Stopped 10/31/18 1915)   metoclopramide (REGLAN) injection 10 mg (10 mg Intravenous Given 10/31/18 1811)   sodium chloride 0 9 % bolus 1,000 mL (0 mL Intravenous Stopped 10/31/18 2110)       Diagnostic Studies  Results Reviewed     Procedure Component Value Units Date/Time    Urine Microscopic [[id number]]  (Abnormal) Collected:  10/31/18 1924    Lab Status:  Final result Specimen: Urine from Urine, Clean Catch Updated:  10/31/18 1956     RBC, UA 0-1 (A) /hpf      WBC, UA None Seen /hpf      Epithelial Cells Occasional /hpf      Bacteria, UA Occasional /hpf     Urine culture [[id number]] Collected:  10/31/18 1924    Lab Status: In process Specimen:  Urine from Urine, Clean Catch Updated:  10/31/18 1917    POCT urinalysis dipstick [[id number]]  (Abnormal) Resulted:  10/31/18 1913    Lab Status:  Final result Specimen:  Urine Updated:  10/31/18 1913    ED Urine Macroscopic [[id number]]  (Abnormal) Collected:  10/31/18 1924    Lab Status:  Final result Specimen:  Urine Updated:  10/31/18 1911     Color, UA Yellow     Clarity, UA Clear     pH, UA 5 5     Leukocytes, UA Negative     Nitrite, UA Negative     Protein, UA Trace (A) mg/dl      Glucose, UA Negative mg/dl      Ketones, UA >=160 (4+) (A) mg/dl      Urobilinogen, UA 0 2 E U /dl      Bilirubin, UA Interference- unable to analyze (A)     Blood, UA Negative     Specific Gravity, UA >=1 030    Narrative:       CLINITEK RESULT    Comprehensive metabolic panel [[id number]]  (Abnormal) Collected:  10/31/18 1810    Lab Status:  Final result Specimen:  Blood from Arm, Left Updated:  10/31/18 1852     Sodium 135 (L) mmol/L      Potassium 3 4 (L) mmol/L      Chloride 98 (L) mmol/L      CO2 25 mmol/L      ANION GAP 12 mmol/L      BUN 8 mg/dL      Creatinine 0 62 mg/dL      Glucose 95 mg/dL      Calcium 9 9 mg/dL      AST 11 U/L      ALT 19 U/L      Alkaline Phosphatase 59 U/L      Total Protein 8 0 g/dL      Albumin 4 1 g/dL      Total Bilirubin 0 33 mg/dL      eGFR 130 ml/min/1 73sq m     Narrative:         National Kidney Disease Education Program recommendations are as follows:  GFR calculation is accurate only with a steady state creatinine  Chronic Kidney disease less than 60 ml/min/1 73 sq  meters  Kidney failure less than 15 ml/min/1 73 sq  meters      Lipase [[id number]]  (Normal) Collected:  10/31/18 1810    Lab Status:  Final result Specimen:  Blood from Arm, Left Updated:  10/31/18 1852     Lipase 149 u/L     CBC and differential [[id number]] Collected:  10/31/18 1810    Lab Status:  Final result Specimen:  Blood from Arm, Left Updated:  10/31/18 1832     WBC 6 15 Thousand/uL      RBC 4 65 Million/uL      Hemoglobin 13 5 g/dL      Hematocrit 39 0 %      MCV 84 fL      MCH 29 0 pg      MCHC 34 6 g/dL      RDW 12 5 %      MPV 9 9 fL      Platelets 520 Thousands/uL      nRBC 0 /100 WBCs      Neutrophils Relative 65 %      Immat GRANS % 0 %      Lymphocytes Relative 24 %      Monocytes Relative 9 %      Eosinophils Relative 1 %      Basophils Relative 1 %      Neutrophils Absolute 4 07 Thousands/µL      Immature Grans Absolute 0 02 Thousand/uL      Lymphocytes Absolute 1 45 Thousands/µL      Monocytes Absolute 0 52 Thousand/µL      Eosinophils Absolute 0 06 Thousand/µL      Basophils Absolute 0 03 Thousands/µL                  No orders to display              Procedures  Pelvic Ultrasound  Performed by: Isaac Guzman  Authorized by: Isaac Guzman     Procedure date/time:  10/31/2018 6:12 PM  Patient location:  ED  Procedure details:     Indications: pregnant with abdominal pain      Assess:  Intrauterine pregnancy and fetal viability    Technique:  Transabdominal obstetric (limited) exam  Uterine findings:     Intrauterine pregnancy: identified      Single gestation: identified      Gestational sac: identified      Fetal heart rate: identified      Fetal heart rate (bpm):  169           Phone Contacts  ED Phone Contact    ED Course  ED Course as of Nov 01 1326   Wed Oct 31, 2018   1837 Pt feeling better following reglan  Pending remainder of labs and UA                                  MDM  CritCare Time    Disposition  Final diagnoses:   Nausea and vomiting in pregnancy   Epigastric abdominal pain     Time reflects when diagnosis was documented in both MDM as applicable and the Disposition within this note     Time User Action Codes Description Comment 10/31/2018  6:40 PM Usha Butler Add [O21 9] Nausea and vomiting in pregnancy     10/31/2018  6:41 PM Carrie, [address] [R10 13] Epigastric abdominal pain       ED Disposition     ED Disposition Condition Comment    Discharge  [address] discharge to home/self care  Condition at discharge: Good        Follow-up Information     Follow up With Specialties Details Why Contact Info Additional Information    OBGYN  Schedule an appointment as soon as possible for a visit in 3 days For re-evaluation      [facility]  Emergency Department Emergency Medicine Go to If symptoms worsen [address] [facility] ED, [address] [place], [place], [address]          Discharge Medication List as of 10/31/2018  6:43 PM      START taking these medications    Details   metoclopramide (REGLAN) 10 mg tablet Take 1 tablet (10 mg total) by mouth every 8 (eight) hours as needed (nausea/vomiting), Starting Wed 10/31/2018, Print      promethazine (PHENERGAN) 25 mg suppository Insert 1 suppository (25 mg total) into the rectum every 6 (six) hours as needed for nausea or vomiting, Starting Wed 10/31/2018, Print           No discharge procedures on file      ED Provider  Electronically Signed by           German Corcoran DO  11/01/18 2329

## 2018-11-01 LAB — BACTERIA UR CULT: NORMAL

## 2018-11-01 NOTE — ED CARE HANDOFF
Emergency Department Sign Out Note        Sign out and transfer of care from Dr Gutierrez  See Separate Emergency Department note  The patient, Manuel Guerra, was evaluated by the previous provider for n/v in pregnancy  Workup Completed:  Cbc, ivfs    ED Course / Workup Pending (followup):  labs                             Procedures  [facility]  Number of Diagnoses or Management Options  Epigastric abdominal pain:   Nausea and vomiting in pregnancy:   Diagnosis management comments: Lab workup is reviewed and benign  Patient does not have signs of hyperemesis gravidarum based upon her blood work today  Patient is currently tolerating p o  And does feel well enough to go home  Patient had 4+ ketones which is signs of significant dehydration  I will give 2nd leader of IV fluids and discharged home with symptomatic treatment    CritCare Time      Disposition  Final diagnoses:   Nausea and vomiting in pregnancy   Epigastric abdominal pain     Time reflects when diagnosis was documented in both MDM as applicable and the Disposition within this note     Time User Action Codes Description Comment    10/31/2018  6:40 PM Usha Butler Add [O21 9] Nausea and vomiting in pregnancy     10/31/2018  6:41 PM Carrie [address] [R10 13] Epigastric abdominal pain       ED Disposition     ED Disposition Condition Comment    Discharge  [address] discharge to home/self care      Condition at discharge: Good        Follow-up Information     Follow up With Specialties Details Why Contact Info Additional Information    OBGYN  Schedule an appointment as soon as possible for a visit in 3 days For re-evaluation      [address] Emergency Department Emergency Medicine Go to If symptoms worsen [address] [place] ED, [address] [place], [place], [address]        Patient's Medications   Discharge Prescriptions    METOCLOPRAMIDE (REGLAN) 10 MG TABLET    Take 1 tablet (10 mg total) by mouth every 8 (eight) hours as needed (nausea/vomiting)       Start Date: 10/31/2018End Date: --       Order Dose: 10 mg       Quantity: 12 tablet    Refills: 0    PROMETHAZINE (PHENERGAN) 25 MG SUPPOSITORY    Insert 1 suppository (25 mg total) into the rectum every 6 (six) hours as needed for nausea or vomiting       Start Date: 10/31/2018End Date: --       Order Dose: 25 mg       Quantity: 5 suppository    Refills: 0     No discharge procedures on file         ED Provider  Electronically Signed by     Linsey Forrester MD  10/31/18 2027

## 2018-11-01 NOTE — ED NOTES
Patient provided with crackers and ginger ale for PO challenge per Dr Polina Singh verbal order        Juan Corey, RN  10/31/18 2000

## 2021-12-04 ENCOUNTER — HOSPITAL ENCOUNTER (EMERGENCY)
Facility: HOSPITAL | Age: 26
Discharge: HOME | End: 2021-12-04
Attending: EMERGENCY MEDICINE
Payer: COMMERCIAL

## 2021-12-04 VITALS
DIASTOLIC BLOOD PRESSURE: 89 MMHG | SYSTOLIC BLOOD PRESSURE: 143 MMHG | RESPIRATION RATE: 16 BRPM | TEMPERATURE: 98.3 F | HEART RATE: 98 BPM | OXYGEN SATURATION: 99 %

## 2021-12-04 DIAGNOSIS — S01.312A LACERATION OF LEFT EXTERNAL EAR, INITIAL ENCOUNTER: Primary | ICD-10-CM

## 2021-12-04 PROCEDURE — 99283 EMERGENCY DEPT VISIT LOW MDM: CPT

## 2021-12-04 PROCEDURE — 99282 EMERGENCY DEPT VISIT SF MDM: CPT | Performed by: PHYSICIAN ASSISTANT

## 2021-12-04 PROCEDURE — 12014 RPR F/E/E/N/L/M 5.1-7.5 CM: CPT | Performed by: PHYSICIAN ASSISTANT

## 2021-12-04 RX ORDER — LIDOCAINE HYDROCHLORIDE 20 MG/ML
JELLY TOPICAL ONCE
Status: COMPLETED | OUTPATIENT
Start: 2021-12-04 | End: 2021-12-04

## 2021-12-04 RX ORDER — LIDOCAINE HYDROCHLORIDE 20 MG/ML
10 INJECTION, SOLUTION EPIDURAL; INFILTRATION; INTRACAUDAL; PERINEURAL ONCE
Status: COMPLETED | OUTPATIENT
Start: 2021-12-04 | End: 2021-12-04

## 2021-12-04 RX ADMIN — LIDOCAINE HYDROCHLORIDE 10 ML: 20 INJECTION, SOLUTION EPIDURAL; INFILTRATION; INTRACAUDAL at 03:15

## 2021-12-04 RX ADMIN — LIDOCAINE HYDROCHLORIDE: 20 JELLY TOPICAL at 03:21

## 2023-06-02 ENCOUNTER — OFFICE VISIT (OUTPATIENT)
Dept: FAMILY MEDICINE CLINIC | Facility: CLINIC | Age: 28
End: 2023-06-02

## 2023-06-02 VITALS
HEART RATE: 83 BPM | OXYGEN SATURATION: 99 % | BODY MASS INDEX: 33.65 KG/M2 | TEMPERATURE: 99.6 F | SYSTOLIC BLOOD PRESSURE: 130 MMHG | DIASTOLIC BLOOD PRESSURE: 80 MMHG | HEIGHT: 66 IN | WEIGHT: 209.4 LBS | RESPIRATION RATE: 18 BRPM

## 2023-06-02 DIAGNOSIS — G44.89 OTHER HEADACHE SYNDROME: ICD-10-CM

## 2023-06-02 DIAGNOSIS — E66.9 OBESITY (BMI 30.0-34.9): ICD-10-CM

## 2023-06-02 DIAGNOSIS — R45.89 DEPRESSED MOOD: Primary | ICD-10-CM

## 2023-06-02 DIAGNOSIS — G47.9 SLEEP DIFFICULTIES: ICD-10-CM

## 2023-06-02 DIAGNOSIS — Z80.41 FAMILY HISTORY OF OVARIAN CANCER: ICD-10-CM

## 2023-06-02 DIAGNOSIS — R53.83 OTHER FATIGUE: ICD-10-CM

## 2023-06-02 DIAGNOSIS — Z13.6 ENCOUNTER FOR LIPID SCREENING FOR CARDIOVASCULAR DISEASE: ICD-10-CM

## 2023-06-02 DIAGNOSIS — Z13.220 ENCOUNTER FOR LIPID SCREENING FOR CARDIOVASCULAR DISEASE: ICD-10-CM

## 2023-06-02 RX ORDER — FLUTICASONE PROPIONATE 50 MCG
SPRAY, SUSPENSION (ML) NASAL
COMMUNITY
Start: 2023-05-26 | End: 2023-06-02 | Stop reason: ALTCHOICE

## 2023-06-02 RX ORDER — SERTRALINE HYDROCHLORIDE 25 MG/1
25 TABLET, FILM COATED ORAL DAILY
Qty: 14 TABLET | Refills: 0 | Status: SHIPPED | OUTPATIENT
Start: 2023-06-02 | End: 2023-06-16
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
## 2023-06-02 NOTE — PATIENT INSTRUCTIONS
Take 25mg Zoloft for 14 days, then take 50mg Zoloft for 14 days     Tylenol 650 every 4 hours alternate with Ibuprofen 800mg every 6-8 hours  Drink 64 oz of water daily   Sleep hygiene: No electronics before bed, sleep in dark quiet room, Designate bed for sleep/sex, mediate before

## 2023-06-03 PROBLEM — E66.9 OBESITY (BMI 30.0-34.9): Status: ACTIVE | Noted: 2023-06-03

## 2023-06-03 PROBLEM — R45.89 DEPRESSED MOOD: Status: ACTIVE | Noted: 2023-06-03

## 2023-06-03 PROBLEM — E66.811 OBESITY (BMI 30.0-34.9): Status: ACTIVE | Noted: 2023-06-03

## 2023-06-03 PROBLEM — G47.9 SLEEP DIFFICULTIES: Status: ACTIVE | Noted: 2023-06-03

## 2023-06-03 PROBLEM — R51.9 HEADACHE: Status: ACTIVE | Noted: 2023-06-03

## 2023-06-03 PROBLEM — Z80.41 FAMILY HISTORY OF OVARIAN CANCER: Status: ACTIVE | Noted: 2023-06-03
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
## 2023-06-03 NOTE — ASSESSMENT & PLAN NOTE
· Ovarian cancer in patient's mother  · Required total hysterectomy, patient is unsure what age mother was diagnosed but knows she was very young  · Referral for genetics

## 2023-06-04 NOTE — ASSESSMENT & PLAN NOTE
[place] 2  Reviewed sleep hygiene:  · No electronics before bed,   · Sleep in dark quiet room,   · Designate bed for sleep/sex,   · Trial mediatation, noise canceling, or peaceful music  · Stick to routine sleep hours

## 2023-06-05 ENCOUNTER — TELEPHONE (OUTPATIENT)
Dept: HEMATOLOGY ONCOLOGY | Facility: CLINIC | Age: 28
End: 2023-06-05

## 2023-06-05 NOTE — TELEPHONE ENCOUNTER
I called Laura to schedule a new patient appointment with the Cancer Risk and Genetics Program       Outcome:   I left a voice message encouraging the patient to call the [facility] [place] at [phone] to schedule this appointment  A [employer] message was also send with our contact information  Follow-up:   At this time the referral will be closed and we will wait to hear back from the patient regarding scheduling this appointment

## 2023-06-06 ENCOUNTER — APPOINTMENT (OUTPATIENT)
Dept: LAB | Facility: CLINIC | Age: 28
End: 2023-06-06
Payer: COMMERCIAL

## 2023-06-06 DIAGNOSIS — Z13.220 ENCOUNTER FOR LIPID SCREENING FOR CARDIOVASCULAR DISEASE: ICD-10-CM

## 2023-06-06 DIAGNOSIS — E66.9 OBESITY (BMI 30.0-34.9): ICD-10-CM

## 2023-06-06 DIAGNOSIS — R53.83 OTHER FATIGUE: ICD-10-CM

## 2023-06-06 DIAGNOSIS — Z13.6 ENCOUNTER FOR LIPID SCREENING FOR CARDIOVASCULAR DISEASE: ICD-10-CM

## 2023-06-06 DIAGNOSIS — R45.89 DEPRESSED MOOD: ICD-10-CM

## 2023-06-06 LAB
ANION GAP SERPL CALCULATED.3IONS-SCNC: 7 MMOL/L (ref 4–13)
BASOPHILS # BLD AUTO: 0.05 THOUSANDS/ÂΜL (ref 0–0.1)
BASOPHILS NFR BLD AUTO: 1 % (ref 0–1)
BUN SERPL-MCNC: 13 MG/DL (ref 5–25)
CALCIUM SERPL-MCNC: 9.4 MG/DL (ref 8.4–10.2)
CHLORIDE SERPL-SCNC: 102 MMOL/L (ref 96–108)
CHOLEST SERPL-MCNC: 237 MG/DL
CO2 SERPL-SCNC: 28 MMOL/L (ref 21–32)
CREAT SERPL-MCNC: 0.63 MG/DL (ref 0.6–1.3)
EOSINOPHIL # BLD AUTO: 0.24 THOUSAND/ÂΜL (ref 0–0.61)
EOSINOPHIL NFR BLD AUTO: 4 % (ref 0–6)
ERYTHROCYTE [DISTWIDTH] IN BLOOD BY AUTOMATED COUNT: 12.7 % (ref 11.6–15.1)
GFR SERPL CREATININE-BSD FRML MDRD: 125 ML/MIN/1.73SQ M
GLUCOSE P FAST SERPL-MCNC: 91 MG/DL (ref 65–99)
HCT VFR BLD AUTO: 41.2 % (ref 34.8–46.1)
HDLC SERPL-MCNC: 61 MG/DL
HGB BLD-MCNC: 13.7 G/DL (ref 11.5–15.4)
IMM GRANULOCYTES # BLD AUTO: 0.01 THOUSAND/UL (ref 0–0.2)
IMM GRANULOCYTES NFR BLD AUTO: 0 % (ref 0–2)
LDLC SERPL CALC-MCNC: 152 MG/DL (ref 0–100)
LYMPHOCYTES # BLD AUTO: 2.31 THOUSANDS/ÂΜL (ref 0.6–4.47)
LYMPHOCYTES NFR BLD AUTO: 42 % (ref 14–44)
MCH RBC QN AUTO: 28.7 PG (ref 26.8–34.3)
MCHC RBC AUTO-ENTMCNC: 33.3 G/DL (ref 31.4–37.4)
MCV RBC AUTO: 86 FL (ref 82–98)
MONOCYTES # BLD AUTO: 0.58 THOUSAND/ÂΜL (ref 0.17–1.22)
MONOCYTES NFR BLD AUTO: 11 % (ref 4–12)
NEUTROPHILS # BLD AUTO: 2.33 THOUSANDS/ÂΜL (ref 1.85–7.62)
NEUTS SEG NFR BLD AUTO: 42 % (ref 43–75)
NONHDLC SERPL-MCNC: 176 MG/DL
NRBC BLD AUTO-RTO: 0 /100 WBCS
PLATELET # BLD AUTO: 325 THOUSANDS/UL (ref 149–390)
PMV BLD AUTO: 10.2 FL (ref 8.9–12.7)
POTASSIUM SERPL-SCNC: 4.1 MMOL/L (ref 3.5–5.3)
RBC # BLD AUTO: 4.77 MILLION/UL (ref 3.81–5.12)
SODIUM SERPL-SCNC: 137 MMOL/L (ref 135–147)
TRIGL SERPL-MCNC: 122 MG/DL
TSH SERPL DL<=0.05 MIU/L-ACNC: 1.22 UIU/ML (ref 0.45–4.5)
WBC # BLD AUTO: 5.52 THOUSAND/UL (ref 4.31–10.16)

## 2023-06-06 PROCEDURE — 80061 LIPID PANEL: CPT

## 2023-06-06 PROCEDURE — 80048 BASIC METABOLIC PNL TOTAL CA: CPT

## 2023-06-06 PROCEDURE — 36415 COLL VENOUS BLD VENIPUNCTURE: CPT

## 2023-06-06 PROCEDURE — 85025 COMPLETE CBC W/AUTO DIFF WBC: CPT

## 2023-06-06 PROCEDURE — 84443 ASSAY THYROID STIM HORMONE: CPT

## 2023-06-08 ENCOUNTER — TELEPHONE (OUTPATIENT)
Dept: PSYCHIATRY | Facility: CLINIC | Age: 28
End: 2023-06-08

## 2023-07-03 DIAGNOSIS — R45.89 DEPRESSED MOOD: ICD-10-CM

## 2023-07-03 RX ORDER — SERTRALINE HYDROCHLORIDE 25 MG/1
25 TABLET, FILM COATED ORAL DAILY
Qty: 90 TABLET | Refills: 0 | Status: SHIPPED | OUTPATIENT
Start: 2023-07-03 | End: 2023-07-17

## 2023-07-03 NOTE — TELEPHONE ENCOUNTER
Caller: Patient     Doctor: Eduardo Lagunas    Reason for call: Patient state that she feeling better taking the 25 mg of Zoloft then the 50. Patient would like to refill the 25mg.

## 2023-07-07 ENCOUNTER — OFFICE VISIT (OUTPATIENT)
Dept: FAMILY MEDICINE CLINIC | Facility: CLINIC | Age: 28
End: 2023-07-07
Payer: COMMERCIAL

## 2023-07-07 ENCOUNTER — TELEPHONE (OUTPATIENT)
Dept: HEMATOLOGY ONCOLOGY | Facility: CLINIC | Age: 28
End: 2023-07-07

## 2023-07-07 VITALS
WEIGHT: 212.6 LBS | HEIGHT: 66 IN | BODY MASS INDEX: 34.17 KG/M2 | TEMPERATURE: 98.7 F | RESPIRATION RATE: 18 BRPM | SYSTOLIC BLOOD PRESSURE: 122 MMHG | OXYGEN SATURATION: 100 % | DIASTOLIC BLOOD PRESSURE: 80 MMHG | HEART RATE: 90 BPM

## 2023-07-07 DIAGNOSIS — R45.89 DEPRESSED MOOD: Primary | ICD-10-CM

## 2023-07-07 PROCEDURE — 99213 OFFICE O/P EST LOW 20 MIN: CPT
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
## 2023-07-07 NOTE — TELEPHONE ENCOUNTER
I spoke with Roselyn Barr to schedule their consultation with Cancer Risk and Genetics. Scheduling Outcome: Patient is scheduled for an appointment on 12/20/23 at 2:00PM with Kiya Sy    Personal/Family History Related to Appointment:     Personal History of Cancer: Patient reports no personal history of cancer    Family History of Cancer: Patient reports family history of Ovarian Cancer, M    Is patient of [address] [place] [address],  [address]?: No    History of Genetic Testing:  Personal History of Genetic Testing: Patient report no personal history of Genetic Testing. Family History of Genetic Testing: Patient reports that no family members have had Genetic Testing. [facility]gen[facility]:  Is patient able to complete our family history questionnaire on a computer?:  Yes    Patient's preferred e-mail address: [email or web address]
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
## 2023-12-13 ENCOUNTER — DOCUMENTATION (OUTPATIENT)
Dept: GENETICS | Facility: CLINIC | Age: 28
End: 2023-12-13

## 2023-12-15 ENCOUNTER — TELEPHONE (OUTPATIENT)
Dept: GENETICS | Facility: CLINIC | Age: 28
End: 2023-12-15

## 2023-12-20 ENCOUNTER — CLINICAL SUPPORT (OUTPATIENT)
Dept: GENETICS | Facility: CLINIC | Age: 28
End: 2023-12-20

## 2023-12-20 ENCOUNTER — APPOINTMENT (OUTPATIENT)
Dept: LAB | Facility: MEDICAL CENTER | Age: 28
End: 2023-12-20
Payer: COMMERCIAL

## 2023-12-20 DIAGNOSIS — Z80.41 FAMILY HISTORY OF MALIGNANT NEOPLASM OF OVARY: ICD-10-CM

## 2023-12-20 DIAGNOSIS — Z80.41 FAMILY HISTORY OF OVARIAN CANCER: ICD-10-CM

## 2023-12-20 PROCEDURE — 36415 COLL VENOUS BLD VENIPUNCTURE: CPT

## 2023-12-20 NOTE — LETTER
2023     Jazmine Rubio DO  [address] [facility] [address]    Patient: Laura Catalan  YOB: 1995  Date of Visit: 2023      Dear Dr. Rubio:    Thank you for referring Laura Catalan to me for evaluation. Below are my notes for this consultation.    If you have questions, please do not hesitate to call me. I look forward to following your patient along with you.         Sincerely,        Roslyn Villa        CC: No Recipients        Pre-Test Genetic Counseling Consult Note    Patient Name: Laura Catalan   /Age: 1995/27 y.o.  Referring Provider:  Jazmine Rubio DO    Date of Service: 2023  Genetic Counselor: Roslyn Villa MS, [facility]  Interpretation Services: None  Location: In-person consult at [place]  Length of Visit: 30 Minutes    Laura was referred to the [facility] Cancer Risk and Genetic Assessment Program due to her family history of ovarian cancer . she presents today to discuss the possibility of a hereditary cancer syndrome, options for genetic testing, and implications for her and her family.    Cancer History and Treatment:   Personal History: no personal history of cancer     Screening Hx:   Breast:  Breast Imaging:   Left Breast US (2018):   Impression: Gradually resolving cystic focus.  No additional workup  For this finding unless otherwise clinically indicated.  Breast Biopsy: none     Colon:  Colonoscopy: none     Gynecologic:  Ovaries and Uterus intact     Skin:  No current screening    Other screening: none     Reproductive History  Age at menarche: 16  Age at first live birth:  23  Menopause: Premenopausal  Hormone replacement: none     Medical and Surgical History  Pertinent surgical history:   Past Surgical History:   Procedure Laterality Date   • TYMPANOSTOMY TUBE PLACEMENT     • WISDOM TOOTH EXTRACTION        Pertinent medical history:No past medical history on file.      Other History:  Height:   Ht Readings from Last 1 Encounters:  "  23 5' 6\" (1.676 m)     Weight:   Wt Readings from Last 1 Encounters:   23 96.4 kg (212 lb 9.6 oz)       Relevant Family History   Patient reports non-Ashkenazi Voodoo ancestry.     Maternal Family History:  Mother: ovarian cancer diagnosed at unknown age (d.53- Covid)   Limited information regarding family history and structure     Paternal Family History:  Non-contributory   Limited information regarding family history and structure     Please refer to the scanned pedigree in the Media Tab for a complete family history     *All history is reported as provided by the patient; records are not available for review, except where indicated.     Assessment:  We discussed sporadic, familial and hereditary cancer.  We reviewed that only 5-10% of cancers are considered hereditary. Cancers such as lung cancer, cervical cancer, testicular cancer, and liver cancer very rarely have a hereditary etiology, and we cannot test for a genetic predisposition for these cancers at this time.  We also discussed the many factors that influence our risk for cancer such as age, environmental exposures, lifestyle choices and family history.      We reviewed the indications suggestive of a hereditary predisposition to cancer.    Genetic testing is indicated for Laura based on the following criteria: Meets NCCN V2.2024 Testing Criteria for Ovarian Cancer Susceptibility Genes: first-degree relative (mother) with ovarian cancer diagnosis     Laura is unaffected, but is considering genetic testing due to a family history of ovarian cancer. Although Laura's mother is the most informative person to undergo genetic testing, Laura can consider genetic testing due to the following:   Patient does not have cancer but is the most informative person to test because their mother   is .    The risks, benefits, and limitations of genetic testing were reviewed with the patient, as well as genetic discrimination laws, and possible test " results (positive, negative, variants of uncertain significance) and their clinical implications. If positive for a mutation, options for managing cancer risk including increased surveillance, chemoprevention, and in some cases prophylactic surgery were discussed.     Laura was informed that if a hereditary cancer syndrome was identified in her, first degree relatives (parents, siblings, and children) have a chance of also inheriting the condition. Genetic testing would allow for predictive genetic testing in other relatives, who may also be at risk depending on their degree of relation.     Billing:  Most individuals pay <$100 for hereditary cancer genetic testing. If insurance covers the cost of the testing, individuals may still pay out of pocket secondary to co-pays, co-insurance, or deductibles. If the cost of the testing exceeds $100, the lab will reach out to the patient via phone or e-mail. The patient will then have the option to proceed with the testing, cancel the testing, or elect the self-pay option of $250. Laura verbalized understanding.     Plan: Patient decided to proceed with testing and provided consent.    Summary:     Sample Collection:  The patient was given a blood kit and instructed to go to a [facility] lab    Genetic Testing Preformed: CustomNext: Cancer® +RNAinsight® (61 genes): APC, ANNA, AXIN2, BAP1, BARD1, BMPR1A, BRCA1, BRCA2, BRIP1, CDH1, CDK4, CDKN1B, CDKN2A, CHEK2, CTNNA1, DICER1, EGLN1, EPCAM, FH, FLCN, GREM1, HOXB13, KIF1B, KIT, MAX, MEN1, MET, MITF, MLH1, MSH2, MSH3, MSH6, MUTYH, NBN, NF1, NTHL1, PALB2, PMS2, POLD1, POLE, POT1, PTEN, RAD50, RAD51C, RAD51D, RB1, RECQL, RET, SDHA, SDHAF2, SDHB, SDHC, SDHD, SMAD4, SMARCA4, STK11, PQCL775, TP53, TSC1, TSC2, VHL      Result Call Information:  In the event that we need to reach Laura via telephone:  I confirmed the patient's mobile number on file as the best number to call with results  I confirmed with the patient that we can leave a  voicemail on her mobile number     Results take approximately 2-3 weeks to complete once test is started.    Laura will be notified via [employer] once results are available.      Additional recommendations for surveillance/medical management will be made pending genetic test results.

## 2023-12-20 NOTE — PROGRESS NOTES
"Pre-Test Genetic Counseling Consult Note    Patient Name: Laura Catalan   /Age:  y.o.  Referring Provider:  Jazmine Rubio DO    Date of Service: 2023  Genetic Counselor: Roslyn Villa MS, [facility]  Interpretation Services: None  Location: In-person consult at [place]  Length of Visit: 30 Minutes    Laura was referred to the [facility] Cancer Risk and Genetic Assessment Program due to her family history of ovarian cancer . she presents today to discuss the possibility of a hereditary cancer syndrome, options for genetic testing, and implications for her and her family.    Cancer History and Treatment:   Personal History: no personal history of cancer     Screening Hx:   Breast:  Breast Imaging:   Left Breast US (2018):   Impression: Gradually resolving cystic focus.  No additional workup  For this finding unless otherwise clinically indicated.  Breast Biopsy: none     Colon:  Colonoscopy: none     Gynecologic:  Ovaries and Uterus intact     Skin:  No current screening    Other screening: none     Reproductive History  Age at menarche: 16  Age at first live birth:  23  Menopause: Premenopausal  Hormone replacement: none     Medical and Surgical History  Pertinent surgical history:   Past Surgical History:   Procedure Laterality Date    TYMPANOSTOMY TUBE PLACEMENT      WISDOM TOOTH EXTRACTION        Pertinent medical history:No past medical history on file.      Other History:  Height:   Ht Readings from Last 1 Encounters:   23 5' 6\" (1.676 m)     Weight:   Wt Readings from Last 1 Encounters:   23 96.4 kg (212 lb 9.6 oz)       Relevant Family History   Patient reports non-Ashkenazi [place] ancestry.     Maternal Family History:  Mother: ovarian cancer diagnosed at unknown age (d.53- Covid)   Limited information regarding family history and structure     Paternal Family History:  Non-contributory   Limited information regarding family history and structure     Please refer to the scanned " pedigree in the Media Tab for a complete family history     *All history is reported as provided by the patient; records are not available for review, except where indicated.     Assessment:  We discussed sporadic, familial and hereditary cancer.  We reviewed that only 5-10% of cancers are considered hereditary. Cancers such as lung cancer, cervical cancer, testicular cancer, and liver cancer very rarely have a hereditary etiology, and we cannot test for a genetic predisposition for these cancers at this time.  We also discussed the many factors that influence our risk for cancer such as age, environmental exposures, lifestyle choices and family history.      We reviewed the indications suggestive of a hereditary predisposition to cancer.    Genetic testing is indicated for Laura based on the following criteria: Meets NCCN V2.2024 Testing Criteria for Ovarian Cancer Susceptibility Genes: first-degree relative (mother) with ovarian cancer diagnosis     Laura is unaffected, but is considering genetic testing due to a family history of ovarian cancer. Although Laura's mother is the most informative person to undergo genetic testing, Laura can consider genetic testing due to the following:   Patient does not have cancer but is the most informative person to test because their mother   is .    The risks, benefits, and limitations of genetic testing were reviewed with the patient, as well as genetic discrimination laws, and possible test results (positive, negative, variants of uncertain significance) and their clinical implications. If positive for a mutation, options for managing cancer risk including increased surveillance, chemoprevention, and in some cases prophylactic surgery were discussed.     Laura was informed that if a hereditary cancer syndrome was identified in her, first degree relatives (parents, siblings, and children) have a chance of also inheriting the condition. Genetic testing would allow for  predictive genetic testing in other relatives, who may also be at risk depending on their degree of relation.     Billing:  Most individuals pay <$100 for hereditary cancer genetic testing. If insurance covers the cost of the testing, individuals may still pay out of pocket secondary to co-pays, co-insurance, or deductibles. If the cost of the testing exceeds $100, the lab will reach out to the patient via phone or e-mail. The patient will then have the option to proceed with the testing, cancel the testing, or elect the self-pay option of $250. Laura verbalized understanding.     Plan: Patient decided to proceed with testing and provided consent.    Summary:     Sample Collection:  The patient was given a blood kit and instructed to go to a [facility] lab    Genetic Testing Preformed: CustomNext: Cancer® +RNAinsight® (61 genes): APC, ANNA, AXIN2, BAP1, BARD1, BMPR1A, BRCA1, BRCA2, BRIP1, CDH1, CDK4, CDKN1B, CDKN2A, CHEK2, CTNNA1, DICER1, EGLN1, EPCAM, FH, FLCN, GREM1, HOXB13, KIF1B, KIT, MAX, MEN1, MET, MITF, MLH1, MSH2, MSH3, MSH6, MUTYH, NBN, NF1, NTHL1, PALB2, PMS2, POLD1, POLE, POT1, PTEN, RAD50, RAD51C, RAD51D, RB1, RECQL, RET, SDHA, SDHAF2, SDHB, SDHC, SDHD, SMAD4, SMARCA4, STK11, [id number], TP53, TSC1, TSC2, VHL      Result Call Information:  In the event that we need to reach Laura via telephone:  I confirmed the patient's mobile number on file as the best number to call with results  I confirmed with the patient that we can leave a voicemail on her mobile number     Results take approximately 2-3 weeks to complete once test is started.    Laura will be notified via [employer] once results are available.      Additional recommendations for surveillance/medical management will be made pending genetic test results.

## 2023-12-26 DIAGNOSIS — R45.89 DEPRESSED MOOD: ICD-10-CM

## 2023-12-26 RX ORDER — SERTRALINE HYDROCHLORIDE 25 MG/1
TABLET, FILM COATED ORAL
Qty: 90 TABLET | Refills: 0 | Status: SHIPPED | OUTPATIENT
Start: 2023-12-26

## 2024-01-08 ENCOUNTER — TELEPHONE (OUTPATIENT)
Dept: GENETICS | Facility: CLINIC | Age: 29
End: 2024-01-08

## 2024-01-08 NOTE — TELEPHONE ENCOUNTER
Post-Test Genetic Counseling Consult Note    Today I spoke with Laura over the phone to review the results of her genetic test for hereditary cancer. We met previously on 12/20/23 for pre-test counseling.  A copy of this consult note and genetic test result will be shared with the patient.      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 The significance of the MLH1 variant is currently not known and therefore this test result cannot be used to help determine Laura cancer risks.      It is possible that the variant was seen in only a handful of individuals, or there may be conflicting or incomplete information in the medical literature about the variant and its association with hereditary cancer.     The laboratory will continue to accumulate information on this variant and will reclassify it as either a positive or negative genetic test result when they are confident that they have adequate information. We will notify Laura as updated information is obtained. It is important to note that the majority of variants of uncertain significance are reclassified as likely benign or benign as additional information about the variant becomes available.     Risk Based on  Family History:  The significance of this variant is currently not known and therefore this test result cannot be used to help determine Laura cancer risks. Rather her personal medical history and family history of cancer are the most important factors used to estimate her risk for developing certain cancers and to direct her medical management.      Laura's risk of ovarian cancer is increased, based on the reported family history. As compared with the general population risk of approximately 1.5%, empiric data suggest that aLura's risk to develop ovarian cancer is approximately 4% given one first-degree relative with ovarian cancer. It is important to note that this may be an overestimate of risk, as the BRCA1/2 status of the families used to generate this risk figure is unknown, [PMID: 6512097]  ,m    Risks and Testing for Family Members:  Genetic testing for this variant is not recommended for relatives who wish to determine their cancer risks for purposes of determining medical management. The presence or absence of this variant in a relative is not clinically meaningful unless the variant is reclassified in the future.     Despite this result, Laura's first-degree relatives may be at increased risk for the cancers based on the family history. We recommend they discuss screening and management recommendations with their healthcare providers.    If Laura has any affected family members with a cancer diagnosis, especially at a young age, they may still consider genetic testing. Relatives who wish to pursue genetic testing can reach out to the [facility] [phone] ([phone] to schedule an appointment or visit [email or web address] to identify a local genetic counselor.     Additional Information:  A healthy lifestyle will improve overall health and reduce risk for illness. Eating a healthy diet and exercising for 4 hours per week is recommended. Both diet and exercise have been shown to help maintain a  healthy weight. Postmenopausal women who are overweight are at higher risk for breast cancer. Moderate to heavy alcohol use can increase the risk for some cancers. Smoking cigarettes can also increase risk for breast, lung, prostate, pancreatic and other cancers.      Plan:   There are no additional recommendations based on Laura's result. she should continue cancer screening and medical management as clinically indicated and as determined appropriate by her healthcare providers.    VUS Result: Laura was strongly encouraged to contact us regarding any changes in her personal or family history of cancer as these changes could alter our recommendation regarding genetic testing and/or cancer screening. Laura was also encouraged to follow up with us on an annual basis as variant classifications are subject to change.

## 2025-06-13 ENCOUNTER — TELEPHONE (OUTPATIENT)
Dept: FAMILY MEDICINE CLINIC | Facility: CLINIC | Age: 30
End: 2025-06-13